# Patient Record
Sex: FEMALE | Race: WHITE
[De-identification: names, ages, dates, MRNs, and addresses within clinical notes are randomized per-mention and may not be internally consistent; named-entity substitution may affect disease eponyms.]

---

## 2017-04-25 ENCOUNTER — HOSPITAL ENCOUNTER (OUTPATIENT)
Dept: HOSPITAL 47 - RADXRMAIN | Age: 52
Discharge: HOME | End: 2017-04-25
Payer: COMMERCIAL

## 2017-04-25 DIAGNOSIS — M16.12: Primary | ICD-10-CM

## 2017-04-25 PROCEDURE — 73502 X-RAY EXAM HIP UNI 2-3 VIEWS: CPT

## 2017-04-26 NOTE — XR
EXAMINATION TYPE: XR Hip Complete LT

 

DATE OF EXAM: 4/25/2017 4:19 PM

 

COMPARISON: NONE

 

HISTORY: Pain

 

TECHNIQUE: 2 views submitted

 

FINDINGS:

There is no evidence of erosive change or acute fracture. Moderate concentric narrowing of the joint 
space. Vascular calcification seen.

 

 

IMPRESSION:

1. Arthritic changes. No erosive changes. Correlate for femoral acetabular impingement

## 2019-07-13 ENCOUNTER — HOSPITAL ENCOUNTER (OUTPATIENT)
Dept: HOSPITAL 47 - LABWHC1 | Age: 54
Discharge: HOME | End: 2019-07-13
Attending: NURSE PRACTITIONER
Payer: COMMERCIAL

## 2019-07-13 DIAGNOSIS — Z87.898: ICD-10-CM

## 2019-07-13 DIAGNOSIS — K58.0: Primary | ICD-10-CM

## 2019-07-13 DIAGNOSIS — Z86.2: ICD-10-CM

## 2019-07-13 DIAGNOSIS — M25.50: ICD-10-CM

## 2019-07-13 LAB
ALBUMIN SERPL-MCNC: 4.4 G/DL (ref 3.8–4.9)
ALBUMIN/GLOB SERPL: 2.2 G/DL (ref 1.6–3.17)
ALP SERPL-CCNC: 101 U/L (ref 41–126)
ALT SERPL-CCNC: 26 U/L (ref 8–44)
ANION GAP SERPL CALC-SCNC: 4.9 MMOL/L (ref 4–12)
AST SERPL-CCNC: 26 U/L (ref 13–35)
BASOPHILS # BLD AUTO: 0.1 K/UL (ref 0–0.2)
BASOPHILS NFR BLD AUTO: 1 %
BUN SERPL-SCNC: 12 MG/DL (ref 9–27)
BUN/CREAT SERPL: 17.14 RATIO (ref 12–20)
CALCIUM SPEC-MCNC: 9.9 MG/DL (ref 8.7–10.3)
CHLORIDE SERPL-SCNC: 108 MMOL/L (ref 96–109)
CO2 SERPL-SCNC: 30.1 MMOL/L (ref 21.6–31.8)
EOSINOPHIL # BLD AUTO: 0.4 K/UL (ref 0–0.7)
EOSINOPHIL NFR BLD AUTO: 6 %
ERYTHROCYTE [DISTWIDTH] IN BLOOD BY AUTOMATED COUNT: 4.5 M/UL (ref 3.8–5.4)
ERYTHROCYTE [DISTWIDTH] IN BLOOD: 12.7 % (ref 11.5–15.5)
GLOBULIN SER CALC-MCNC: 2 G/DL (ref 1.6–3.3)
GLUCOSE SERPL-MCNC: 86 MG/DL (ref 70–110)
HCT VFR BLD AUTO: 41.2 % (ref 34–46)
HGB BLD-MCNC: 13.4 GM/DL (ref 11.4–16)
LYMPHOCYTES # SPEC AUTO: 2 K/UL (ref 1–4.8)
LYMPHOCYTES NFR SPEC AUTO: 30 %
MCH RBC QN AUTO: 29.7 PG (ref 25–35)
MCHC RBC AUTO-ENTMCNC: 32.4 G/DL (ref 31–37)
MCV RBC AUTO: 91.7 FL (ref 80–100)
MONOCYTES # BLD AUTO: 0.4 K/UL (ref 0–1)
MONOCYTES NFR BLD AUTO: 6 %
NEUTROPHILS # BLD AUTO: 3.8 K/UL (ref 1.3–7.7)
NEUTROPHILS NFR BLD AUTO: 56 %
PLATELET # BLD AUTO: 340 K/UL (ref 150–450)
POTASSIUM SERPL-SCNC: 4.3 MMOL/L (ref 3.5–5.5)
PROT SERPL-MCNC: 6.4 G/DL (ref 6.2–8.2)
SODIUM SERPL-SCNC: 143 MMOL/L (ref 135–145)
WBC # BLD AUTO: 6.7 K/UL (ref 3.8–10.6)

## 2019-07-13 PROCEDURE — 86140 C-REACTIVE PROTEIN: CPT

## 2019-07-13 PROCEDURE — 85652 RBC SED RATE AUTOMATED: CPT

## 2019-07-13 PROCEDURE — 86255 FLUORESCENT ANTIBODY SCREEN: CPT

## 2019-07-13 PROCEDURE — 84436 ASSAY OF TOTAL THYROXINE: CPT

## 2019-07-13 PROCEDURE — 84480 ASSAY TRIIODOTHYRONINE (T3): CPT

## 2019-07-13 PROCEDURE — 82728 ASSAY OF FERRITIN: CPT

## 2019-07-13 PROCEDURE — 83540 ASSAY OF IRON: CPT

## 2019-07-13 PROCEDURE — 83550 IRON BINDING TEST: CPT

## 2019-07-13 PROCEDURE — 80053 COMPREHEN METABOLIC PANEL: CPT

## 2019-07-13 PROCEDURE — 84443 ASSAY THYROID STIM HORMONE: CPT

## 2019-07-13 PROCEDURE — 86431 RHEUMATOID FACTOR QUANT: CPT

## 2019-07-13 PROCEDURE — 85025 COMPLETE CBC W/AUTO DIFF WBC: CPT

## 2019-07-13 PROCEDURE — 86038 ANTINUCLEAR ANTIBODIES: CPT

## 2019-07-13 PROCEDURE — 36415 COLL VENOUS BLD VENIPUNCTURE: CPT

## 2019-07-15 LAB — ENDOMYSIUM IGA TITR SER IF: (no result) TITER
